# Patient Record
Sex: MALE | Race: WHITE
[De-identification: names, ages, dates, MRNs, and addresses within clinical notes are randomized per-mention and may not be internally consistent; named-entity substitution may affect disease eponyms.]

---

## 2019-08-05 NOTE — REP
SCROTAL ULTRASOUND:

 

Real-time sonographic evaluation of the scrotum and contents performed.

 

The testicles are normal in size and echotexture, right testicle measuring 4.3 x

2.1 x 3.0 cm and left testicle 4.6 x 2.0 x 2.6 cm. There is no testicular mass or

torsion.  Resistive index right testicle is 0.57 and left testicle 0.51.

Epididymis is unremarkable bilaterally.  Multiple tortuous venous structures are

seen medial and inferior to the left testicle, which is slightly increased in

size with Valsalva maneuver, measuring up to 4 mm in diameter.

 

IMPRESSION:

 

No testicular mass or torsion.  Small left varicocele.

 

 

Electronically Signed by

Colby Reyes MD 08/07/2019 07:48 A

## 2019-10-01 ENCOUNTER — HOSPITAL ENCOUNTER (EMERGENCY)
Dept: HOSPITAL 53 - M ED | Age: 24
Discharge: HOME | End: 2019-10-01
Payer: COMMERCIAL

## 2019-10-01 VITALS — DIASTOLIC BLOOD PRESSURE: 63 MMHG | SYSTOLIC BLOOD PRESSURE: 116 MMHG

## 2019-10-01 VITALS — HEIGHT: 66 IN | BODY MASS INDEX: 28.38 KG/M2 | WEIGHT: 176.59 LBS

## 2019-10-01 DIAGNOSIS — R10.13: Primary | ICD-10-CM

## 2019-10-01 DIAGNOSIS — K29.00: ICD-10-CM

## 2019-10-01 LAB
APPEARANCE UR: CLEAR
BACTERIA UR QL AUTO: NEGATIVE
BASOPHILS # BLD AUTO: 0.1 10^3/UL (ref 0–0.2)
BASOPHILS NFR BLD AUTO: 1.4 % (ref 0–1)
BILIRUB UR QL STRIP.AUTO: NEGATIVE
EOSINOPHIL # BLD AUTO: 1.4 10^3/UL (ref 0–0.5)
EOSINOPHIL NFR BLD AUTO: 21.5 % (ref 0–3)
GLUCOSE UR QL STRIP.AUTO: NEGATIVE MG/DL
HCT VFR BLD AUTO: 43.4 % (ref 42–52)
HGB BLD-MCNC: 14.9 G/DL (ref 13.5–17.5)
HGB UR QL STRIP.AUTO: NEGATIVE
KETONES UR QL STRIP.AUTO: NEGATIVE MG/DL
LEUKOCYTE ESTERASE UR QL STRIP.AUTO: NEGATIVE
LYMPHOCYTES # BLD AUTO: 2.1 10^3/UL (ref 1.5–5)
LYMPHOCYTES NFR BLD AUTO: 32.6 % (ref 24–44)
MCH RBC QN AUTO: 29.9 PG (ref 27–33)
MCHC RBC AUTO-ENTMCNC: 34.3 G/DL (ref 32–36.5)
MCV RBC AUTO: 87 FL (ref 80–96)
MONOCYTES # BLD AUTO: 0.5 10^3/UL (ref 0–0.8)
MONOCYTES NFR BLD AUTO: 8 % (ref 0–5)
NEUTROPHILS # BLD AUTO: 2.3 10^3/UL (ref 1.5–8.5)
NEUTROPHILS NFR BLD AUTO: 36.2 % (ref 36–66)
NITRITE UR QL STRIP.AUTO: NEGATIVE
PH UR STRIP.AUTO: 8 UNITS (ref 5–9)
PLATELET # BLD AUTO: 177 10^3/UL (ref 150–450)
PROT UR QL STRIP.AUTO: NEGATIVE MG/DL
RBC # BLD AUTO: 4.99 10^6/UL (ref 4.3–6.1)
RBC # UR AUTO: 1 /HPF (ref 0–3)
SP GR UR STRIP.AUTO: 1.06 (ref 1–1.03)
SQUAMOUS #/AREA URNS AUTO: 0 /HPF (ref 0–6)
UROBILINOGEN UR QL STRIP.AUTO: 0.2 MG/DL (ref 0–2)
WBC # BLD AUTO: 6.4 10^3/UL (ref 4–10)
WBC #/AREA URNS AUTO: 0 /HPF (ref 0–3)

## 2019-10-01 PROCEDURE — 99284 EMERGENCY DEPT VISIT MOD MDM: CPT

## 2019-10-01 PROCEDURE — 85025 COMPLETE CBC W/AUTO DIFF WBC: CPT

## 2019-10-01 PROCEDURE — 80047 BASIC METABLC PNL IONIZED CA: CPT

## 2019-10-01 PROCEDURE — 81001 URINALYSIS AUTO W/SCOPE: CPT

## 2019-10-01 PROCEDURE — 74177 CT ABD & PELVIS W/CONTRAST: CPT

## 2019-10-01 PROCEDURE — 96374 THER/PROPH/DIAG INJ IV PUSH: CPT

## 2019-10-01 NOTE — REP
CT ABDOMEN AND PELVIS WITH IV CONTRAST:

 

TECHNIQUE:  Axial contrast enhanced images from the lung bases to the pubic

symphysis using 100 mL Isovue 370 intravenous contrast material with multiplanar

reformations.

 

The visualized lung bases are clear.  The liver, spleen, adrenals, pancreas, and

kidneys demonstrate no abnormality.  Abdominal aorta is normal in caliber with no

aneurysm.  There is no adenopathy.  There is no free air or free fluid.  No bowel

thickening is seen.  The appendix is normal.  There is no pelvic mass.  There is

no anterior abdominal wall defect.  Urinary bladder is mildly distended and

grossly unremarkable.

 

IMPRESSION:

 

No acute abnormalities.  No evidence of appendicitis.

 

 

Electronically Signed by

Colby Reyes MD 10/02/2019 04:03 P